# Patient Record
(demographics unavailable — no encounter records)

---

## 2024-12-06 NOTE — ASSESSMENT
[FreeTextEntry1] : 27 yo 10/24 uri on antibiotics . She got palpitations. Since palpitations. She gets chest after eat sometimes before eat. Not exertional She has PHILLIPS. She does not snore. ABDIAS 1;320. To see rheum She does not exercise. She has palpitations PHILLIPS chest pain. She needs A MCOT stress echo. To see GI.

## 2024-12-06 NOTE — HISTORY OF PRESENT ILLNESS
[FreeTextEntry1] : 27 yo 10/24 uri on antibiotics . She got palpitations. Since palpitations. She gets chest after eat sometimes before eat. Not exertional She has PHILLIPS. She does not snore. ABDIAS 1;320. To see rheum

## 2024-12-06 NOTE — REVIEW OF SYSTEMS
[Fever] : no fever [Chills] : no chills [Blurry Vision] : no blurred vision [Hearing Loss] : no hearing loss [Dyspnea on exertion] : dyspnea during exertion [Wheezing] : no wheezing [Abdominal Pain] : abdominal pain [Dysuria] : no dysuria [Joint Pain] : joint pain [Hip Pain] : hip pain [Knee Pain] : knee pain [Rash] : no rash [Dizziness] : dizziness [Confusion] : no confusion was observed [Easy Bleeding] : no tendency for easy bleeding